# Patient Record
(demographics unavailable — no encounter records)

---

## 2025-05-29 NOTE — HEALTH RISK ASSESSMENT
[Good] : ~his/her~  mood as  good [No] : In the past 12 months have you used drugs other than those required for medical reasons? No [0] : 2) Feeling down, depressed, or hopeless: Not at all (0) [PHQ-2 Negative - No further assessment needed] : PHQ-2 Negative - No further assessment needed [Never] : Never [None] : None [Employed] : employed [Audit-CScore] : 0 [[id number]] : 0

## 2025-05-29 NOTE — HISTORY OF PRESENT ILLNESS
[de-identified] : 21 year old M with no significant PMH presents for CPE. Pt denies CP/SOB, fever/chills, n/v/d/c.

## 2025-05-29 NOTE — PLAN
[FreeTextEntry1] : Routine blood work drawn in office and urine collected today. Pt advised to sign up for [facility] portal to review labs and communicate any questions or concerns directly. Yearly physical and return as needed for illness, medication refills, and new or existing complaints.